# Patient Record
Sex: MALE
[De-identification: names, ages, dates, MRNs, and addresses within clinical notes are randomized per-mention and may not be internally consistent; named-entity substitution may affect disease eponyms.]

---

## 2020-04-01 ENCOUNTER — TRANSCRIPTION ENCOUNTER (OUTPATIENT)
Age: 65
End: 2020-04-01

## 2020-04-03 ENCOUNTER — TRANSCRIPTION ENCOUNTER (OUTPATIENT)
Age: 65
End: 2020-04-03

## 2020-06-19 ENCOUNTER — TRANSCRIPTION ENCOUNTER (OUTPATIENT)
Age: 65
End: 2020-06-19

## 2020-11-11 PROBLEM — Z00.00 ENCOUNTER FOR PREVENTIVE HEALTH EXAMINATION: Status: ACTIVE | Noted: 2020-11-11

## 2020-12-24 ENCOUNTER — INPATIENT (INPATIENT)
Facility: HOSPITAL | Age: 65
LOS: 0 days | Discharge: HOME | DRG: 65 | End: 2020-12-25
Attending: SPECIALIST | Admitting: SPECIALIST
Payer: COMMERCIAL

## 2020-12-24 VITALS
DIASTOLIC BLOOD PRESSURE: 110 MMHG | HEIGHT: 72 IN | WEIGHT: 220.02 LBS | RESPIRATION RATE: 18 BRPM | OXYGEN SATURATION: 96 % | TEMPERATURE: 98 F | SYSTOLIC BLOOD PRESSURE: 223 MMHG | HEART RATE: 49 BPM

## 2020-12-24 DIAGNOSIS — R20.2 PARESTHESIA OF SKIN: ICD-10-CM

## 2020-12-24 LAB
ALBUMIN SERPL ELPH-MCNC: 3.5 G/DL — SIGNIFICANT CHANGE UP (ref 3.3–5)
ALP SERPL-CCNC: 121 U/L — HIGH (ref 40–120)
ALT FLD-CCNC: 22 U/L — SIGNIFICANT CHANGE UP (ref 10–45)
ANION GAP SERPL CALC-SCNC: 13 MMOL/L — SIGNIFICANT CHANGE UP (ref 5–17)
APTT BLD: 34.5 SEC — SIGNIFICANT CHANGE UP (ref 27.5–35.5)
AST SERPL-CCNC: 16 U/L — SIGNIFICANT CHANGE UP (ref 10–40)
BASOPHILS # BLD AUTO: 0.05 K/UL — SIGNIFICANT CHANGE UP (ref 0–0.2)
BASOPHILS NFR BLD AUTO: 0.5 % — SIGNIFICANT CHANGE UP (ref 0–2)
BILIRUB SERPL-MCNC: 1 MG/DL — SIGNIFICANT CHANGE UP (ref 0.2–1.2)
BUN SERPL-MCNC: 23 MG/DL — SIGNIFICANT CHANGE UP (ref 7–23)
CALCIUM SERPL-MCNC: 8.8 MG/DL — SIGNIFICANT CHANGE UP (ref 8.4–10.5)
CHLORIDE SERPL-SCNC: 104 MMOL/L — SIGNIFICANT CHANGE UP (ref 96–108)
CK MB CFR SERPL CALC: 2.4 NG/ML — SIGNIFICANT CHANGE UP (ref 0–6.7)
CK SERPL-CCNC: 67 U/L — SIGNIFICANT CHANGE UP (ref 30–200)
CO2 SERPL-SCNC: 23 MMOL/L — SIGNIFICANT CHANGE UP (ref 22–31)
CREAT SERPL-MCNC: 0.94 MG/DL — SIGNIFICANT CHANGE UP (ref 0.5–1.3)
CRP SERPL-MCNC: 1.35 MG/DL — HIGH (ref 0–0.4)
EOSINOPHIL # BLD AUTO: 0.28 K/UL — SIGNIFICANT CHANGE UP (ref 0–0.5)
EOSINOPHIL NFR BLD AUTO: 3 % — SIGNIFICANT CHANGE UP (ref 0–6)
ERYTHROCYTE [SEDIMENTATION RATE] IN BLOOD: 46 MM/HR — HIGH (ref 0–20)
GLUCOSE SERPL-MCNC: 124 MG/DL — HIGH (ref 70–99)
HCT VFR BLD CALC: 40.1 % — SIGNIFICANT CHANGE UP (ref 39–50)
HGB BLD-MCNC: 13.6 G/DL — SIGNIFICANT CHANGE UP (ref 13–17)
IMM GRANULOCYTES NFR BLD AUTO: 0.5 % — SIGNIFICANT CHANGE UP (ref 0–1.5)
INR BLD: 1.1 RATIO — SIGNIFICANT CHANGE UP (ref 0.88–1.16)
LYMPHOCYTES # BLD AUTO: 1.69 K/UL — SIGNIFICANT CHANGE UP (ref 1–3.3)
LYMPHOCYTES # BLD AUTO: 18.2 % — SIGNIFICANT CHANGE UP (ref 13–44)
MCHC RBC-ENTMCNC: 27.9 PG — SIGNIFICANT CHANGE UP (ref 27–34)
MCHC RBC-ENTMCNC: 33.9 GM/DL — SIGNIFICANT CHANGE UP (ref 32–36)
MCV RBC AUTO: 82.2 FL — SIGNIFICANT CHANGE UP (ref 80–100)
MONOCYTES # BLD AUTO: 0.55 K/UL — SIGNIFICANT CHANGE UP (ref 0–0.9)
MONOCYTES NFR BLD AUTO: 5.9 % — SIGNIFICANT CHANGE UP (ref 2–14)
NEUTROPHILS # BLD AUTO: 6.65 K/UL — SIGNIFICANT CHANGE UP (ref 1.8–7.4)
NEUTROPHILS NFR BLD AUTO: 71.9 % — SIGNIFICANT CHANGE UP (ref 43–77)
NRBC # BLD: 0 /100 WBCS — SIGNIFICANT CHANGE UP (ref 0–0)
PLATELET # BLD AUTO: 303 K/UL — SIGNIFICANT CHANGE UP (ref 150–400)
PLATELET RESPONSE ASPIRIN RESULT: 410 ARU — SIGNIFICANT CHANGE UP (ref 350–700)
POTASSIUM SERPL-MCNC: 3.4 MMOL/L — LOW (ref 3.5–5.3)
POTASSIUM SERPL-SCNC: 3.4 MMOL/L — LOW (ref 3.5–5.3)
PROT SERPL-MCNC: 7.1 G/DL — SIGNIFICANT CHANGE UP (ref 6–8.3)
PROTHROM AB SERPL-ACNC: 13.1 SEC — SIGNIFICANT CHANGE UP (ref 10.6–13.6)
RBC # BLD: 4.88 M/UL — SIGNIFICANT CHANGE UP (ref 4.2–5.8)
RBC # FLD: 13.9 % — SIGNIFICANT CHANGE UP (ref 10.3–14.5)
SODIUM SERPL-SCNC: 140 MMOL/L — SIGNIFICANT CHANGE UP (ref 135–145)
TROPONIN T, HIGH SENSITIVITY RESULT: 14 NG/L — SIGNIFICANT CHANGE UP (ref 0–51)
WBC # BLD: 9.27 K/UL — SIGNIFICANT CHANGE UP (ref 3.8–10.5)
WBC # FLD AUTO: 9.27 K/UL — SIGNIFICANT CHANGE UP (ref 3.8–10.5)

## 2020-12-24 PROCEDURE — 70551 MRI BRAIN STEM W/O DYE: CPT | Mod: 26

## 2020-12-24 PROCEDURE — 71045 X-RAY EXAM CHEST 1 VIEW: CPT | Mod: 26

## 2020-12-24 PROCEDURE — 99285 EMERGENCY DEPT VISIT HI MDM: CPT

## 2020-12-24 PROCEDURE — 93306 TTE W/DOPPLER COMPLETE: CPT | Mod: 26

## 2020-12-24 PROCEDURE — 70496 CT ANGIOGRAPHY HEAD: CPT | Mod: 26

## 2020-12-24 PROCEDURE — 93010 ELECTROCARDIOGRAM REPORT: CPT

## 2020-12-24 PROCEDURE — 70498 CT ANGIOGRAPHY NECK: CPT | Mod: 26

## 2020-12-24 PROCEDURE — 99223 1ST HOSP IP/OBS HIGH 75: CPT

## 2020-12-24 RX ORDER — NICARDIPINE HYDROCHLORIDE 30 MG/1
5 CAPSULE, EXTENDED RELEASE ORAL
Qty: 40 | Refills: 0 | Status: DISCONTINUED | OUTPATIENT
Start: 2020-12-24 | End: 2020-12-24

## 2020-12-24 RX ORDER — ASPIRIN/CALCIUM CARB/MAGNESIUM 324 MG
81 TABLET ORAL DAILY
Refills: 0 | Status: DISCONTINUED | OUTPATIENT
Start: 2020-12-24 | End: 2020-12-24

## 2020-12-24 RX ORDER — ENOXAPARIN SODIUM 100 MG/ML
40 INJECTION SUBCUTANEOUS DAILY
Refills: 0 | Status: DISCONTINUED | OUTPATIENT
Start: 2020-12-24 | End: 2020-12-25

## 2020-12-24 RX ORDER — CLOPIDOGREL BISULFATE 75 MG/1
75 TABLET, FILM COATED ORAL DAILY
Refills: 0 | Status: DISCONTINUED | OUTPATIENT
Start: 2020-12-24 | End: 2020-12-25

## 2020-12-24 RX ORDER — LABETALOL HCL 100 MG
10 TABLET ORAL ONCE
Refills: 0 | Status: COMPLETED | OUTPATIENT
Start: 2020-12-24 | End: 2020-12-24

## 2020-12-24 RX ORDER — ZOLPIDEM TARTRATE 10 MG/1
5 TABLET ORAL AT BEDTIME
Refills: 0 | Status: DISCONTINUED | OUTPATIENT
Start: 2020-12-24 | End: 2020-12-25

## 2020-12-24 RX ORDER — PANTOPRAZOLE SODIUM 20 MG/1
40 TABLET, DELAYED RELEASE ORAL
Refills: 0 | Status: DISCONTINUED | OUTPATIENT
Start: 2020-12-24 | End: 2020-12-25

## 2020-12-24 RX ORDER — ATORVASTATIN CALCIUM 80 MG/1
80 TABLET, FILM COATED ORAL AT BEDTIME
Refills: 0 | Status: DISCONTINUED | OUTPATIENT
Start: 2020-12-24 | End: 2020-12-25

## 2020-12-24 RX ORDER — ASPIRIN/CALCIUM CARB/MAGNESIUM 324 MG
81 TABLET ORAL DAILY
Refills: 0 | Status: DISCONTINUED | OUTPATIENT
Start: 2020-12-25 | End: 2020-12-25

## 2020-12-24 RX ORDER — ALLOPURINOL 300 MG
100 TABLET ORAL DAILY
Refills: 0 | Status: DISCONTINUED | OUTPATIENT
Start: 2020-12-24 | End: 2020-12-25

## 2020-12-24 RX ORDER — ALPRAZOLAM 0.25 MG
0.75 TABLET ORAL DAILY
Refills: 0 | Status: DISCONTINUED | OUTPATIENT
Start: 2020-12-24 | End: 2020-12-25

## 2020-12-24 RX ORDER — POTASSIUM CHLORIDE 20 MEQ
40 PACKET (EA) ORAL ONCE
Refills: 0 | Status: COMPLETED | OUTPATIENT
Start: 2020-12-24 | End: 2020-12-24

## 2020-12-24 RX ADMIN — Medication 40 MILLIEQUIVALENT(S): at 14:45

## 2020-12-24 RX ADMIN — Medication 0.75 MILLIGRAM(S): at 23:02

## 2020-12-24 RX ADMIN — Medication 10 MILLIGRAM(S): at 12:30

## 2020-12-24 RX ADMIN — CLOPIDOGREL BISULFATE 75 MILLIGRAM(S): 75 TABLET, FILM COATED ORAL at 14:45

## 2020-12-24 RX ADMIN — ATORVASTATIN CALCIUM 80 MILLIGRAM(S): 80 TABLET, FILM COATED ORAL at 23:03

## 2020-12-24 RX ADMIN — ZOLPIDEM TARTRATE 5 MILLIGRAM(S): 10 TABLET ORAL at 23:01

## 2020-12-24 NOTE — OCCUPATIONAL THERAPY INITIAL EVALUATION ADULT - PERTINENT HX OF CURRENT PROBLEM, REHAB EVAL
64yo man with PMH of HTN, HLD, and ?DM presented with L-sided numbness that he noted after waking this morning around 0830h. Patient reports he had poor sleep the night prior but went to bed feeling well with LKN 0400h 12/24/20.

## 2020-12-24 NOTE — H&P ADULT - ATTENDING COMMENTS
VASCULAR NEUROLOGY ATTENDING  The patient is seen and examined the history and imaging are reviewed. I agree with the resident note unless otherwise noted. R thalamic infarct likely small vessel. Poorly controlled HTN. New JEFFRY. Appreciate cards recs. Aggressive vascular risk factor modification. Close outpatient follow-up.

## 2020-12-24 NOTE — ED ADULT NURSE NOTE - NSIMPLEMENTINTERV_GEN_ALL_ED
Implemented All Fall Risk Interventions:  [place] to call system. Call bell, personal items and telephone within reach. Instruct patient to call for assistance. Room bathroom lighting operational. Non-slip footwear when patient is off stretcher. Physically safe environment: no spills, clutter or unnecessary equipment. Stretcher in lowest position, wheels locked, appropriate side rails in place. Provide visual cue, wrist band, yellow gown, etc. Monitor gait and stability. Monitor for mental status changes and reorient to person, place, and time. Review medications for side effects contributing to fall risk. Reinforce activity limits and safety measures with patient and family.

## 2020-12-24 NOTE — CONSULT NOTE ADULT - ASSESSMENT
1. thalamic stroke small secondary to hypertension poorly controlled  2. hemodynmaically stable BP presently 170/80  3. no evidence of chf or ischemia    Recommend  2 D echo  asa plavix  lipitor  for BP discharge on   bystolic 20 AM 10 PM  lasix 40  restsart amlodipine increase to 5 BID if gingivitis an issue can switch to PO cardene  salt restriction diet and weigh loss  can consider adding spirinolacton3 25 as outpatient

## 2020-12-24 NOTE — CONSULT NOTE ADULT - SUBJECTIVE AND OBJECTIVE BOX
CHIEF COMPLAINT:  numbness hypertension out of control thalamic stroke  HISTORY OF PRESENT ILLNESS:  HPI:  Neurology  Consult Note  12-24-20    Name:  JAZMIN JONES; 65y (10-Benson-1955)    Reason for Admission: L-numbness    Chief Complaint:  HPI: 64yo man with PMH of HTN, HLD, and ?DM presented with L-sided numbness that he noted after waking this morning around 0830h. Patient reports he had poor sleep the night prior but went to bed feeling well with LKN 0400h 12/24/20. Patient states that when his wife awoke him he noted numbness along the L side of his mouth and his L fingers, which progressed to the rest of his L face/scalp and L arm and leg. Patient reports mild gait instability due to numbness but denies weakness, headache, changes in speech, dizziness, nausea, or vomiting. Patient reports inconsistent medication use but reports to be taking aspirin including this morning. In the ED code stroke was activated. NIHSS of 0. Pre-MRS of 0. CTH showed no acute pathology. CTA showed no LVO. Patient was not a candidate for tPA since he was out of window, and was not a candidate for thrombectomy due to no LVO.  admissopn /105 now improved  no cp or sob feels ok  has gained weight and is not careful with salt  has gingivitis on amlodipine  not always compliant with mes diet or salt  no cp or sob  Review of Systems:  As states in HPI.        PMHx:   HTN (hypertension) (24 Dec 2020 13:26)    PAST MEDICAL & SURGICAL HISTORY:  HTN (hypertension)            MEDICATIONS:  clopidogrel Tablet 75 milliGRAM(s) Oral daily  enoxaparin Injectable 40 milliGRAM(s) SubCutaneous daily  niCARdipine Infusion 5 mG/Hr IV Continuous <Continuous>        ALPRAZolam 0.75 milliGRAM(s) Oral daily  zolpidem 5 milliGRAM(s) Oral at bedtime PRN  zolpidem 5 milliGRAM(s) Oral at bedtime PRN    pantoprazole    Tablet 40 milliGRAM(s) Oral before breakfast    allopurinol 100 milliGRAM(s) Oral daily  atorvastatin 80 milliGRAM(s) Oral at bedtime        FAMILY HISTORY:      SOCIAL HISTORY:    [ ] Non-smoker  [ ] Smoker  [ ] Alcohol    Allergies    No Known Allergies    Intolerances      PHYSICAL EXAM:  T(C): 36.9 (12-24-20 @ 13:32), Max: 36.9 (12-24-20 @ 12:08)  HR: 54 (12-24-20 @ 16:00) (49 - 54)  BP: 197/92 (12-24-20 @ 16:00) (184/84 - 223/110)  RR: 14 (12-24-20 @ 16:00) (14 - 18)  SpO2: 96% (12-24-20 @ 16:00) (96% - 98%)  Wt(kg): --  I&O's Summary      Appearance: Normal	  HEENT:   Normal oral mucosa, PERRL, EOMI	  Cardiovascular: Normal S1 S2, No JVD, No murmurs  Respiratory: Lungs clear to auscultation	  Psychiatry: A & O x 3, Mood & affect appropriate  Gastrointestinal:  Soft, Non-tender, + BS	  Skin: No rashes, No ecchymoses, No cyanosis	  Neurologic: Non-focal  Extremities: No clubbing, cyanosis or edema  Vascular: Peripheral pulses palpable 2+ bilaterally    TELEMETRY: 	    ECG: NSR sinus bradycardia LVH 	  RADIOLOGY:  < from: MR Head No Cont (12.24.20 @ 13:06) >  NTERPRETATION:  CLINICAL INDICATION: Code stroke, left sensory paresthesias      Magnetic resonance imaging of the brain was carried out with transaxial FLAIR, , axial susceptibility weighted series, diffusion weighted series on a 1.5 Shreya magnet.    Comparison is made with the prior CT/CTA of .    The fourth, third and lateral ventricles are normal in size and position. There is no hemorrhage, mass or shift of the midline structures. A tiny focus of diffusion restriction is identified in the right thalamus. Scattered nonspecific white matter changes are also identified      IMPRESSION: Tiny acute infarct right thalamus.            	  	  LABS:	 	    CARDIAC MARKERS:                                  13.6   9.27  )-----------( 303      ( 24 Dec 2020 12:36 )             40.1     12-24    140  |  104  |  23  ----------------------------<  124<H>  3.4<L>   |  23  |  0.94    Ca    8.8      24 Dec 2020 12:36    TPro  7.1  /  Alb  3.5  /  TBili  1.0  /  DBili  x   /  AST  16  /  ALT  22  /  AlkPhos  121<H>  12-24    proBNP:   Lipid Profile:   HgA1c:   TSH:

## 2020-12-24 NOTE — ED PROVIDER NOTE - CARE PLAN
Principal Discharge DX:	Tingling of left upper extremity and left side of face  Secondary Diagnosis:	Hypertension, unspecified type   Principal Discharge DX:	Tingling of left upper extremity and left side of face  Secondary Diagnosis:	Hypertension, unspecified type  Secondary Diagnosis:	TIA (transient ischemic attack)

## 2020-12-24 NOTE — ED PROVIDER NOTE - PHYSICAL EXAMINATION
charo aaox3 nad ncat eprrl no gaze prwef sensation to face intaqct no drrop   s1s2 rrr ctabl abd soft nt - no rash  cn2-12intact no aphaseia - no drift strength 5/5 ue le bl - no dysmetria -

## 2020-12-24 NOTE — OCCUPATIONAL THERAPY INITIAL EVALUATION ADULT - LIVES WITH, PROFILE
Pt lives with wife in private house with 3 steps to enter and 18 steps inside. Pt at baseline is ind for ADLs and functional mobility. +works, +drives/spouse

## 2020-12-24 NOTE — H&P ADULT - NSHPLABSRESULTS_GEN_ALL_CORE
Labs:                        13.6   9.27  )-----------( 303      ( 24 Dec 2020 12:36 )             40.1     12-24    140  |  104  |  23  ----------------------------<  124<H>  3.4<L>   |  23  |  0.94    Ca    8.8      24 Dec 2020 12:36    TPro  7.1  /  Alb  3.5  /  TBili  1.0  /  DBili  x   /  AST  16  /  ALT  22  /  AlkPhos  121<H>  12-24    CAPILLARY BLOOD GLUCOSE      POCT Blood Glucose.: 123 mg/dL (24 Dec 2020 12:07)    LIVER FUNCTIONS - ( 24 Dec 2020 12:36 )  Alb: 3.5 g/dL / Pro: 7.1 g/dL / ALK PHOS: 121 U/L / ALT: 22 U/L / AST: 16 U/L / GGT: x             PT/INR - ( 24 Dec 2020 12:36 )   PT: 13.1 sec;   INR: 1.10 ratio    PTT - ( 24 Dec 2020 12:36 )  PTT:34.5 sec          Radiology:  MR Head No Cont:  (24 Dec 2020 13:06)  IMPRESSION: Tiny acute infarct right thalamus.      CT Head No Cont:  (24 Dec 2020 12:27)  IMPRESSION: Unremarkable noncontrast head CT. No hemorrhage. Normal CTA of the head and neck. 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

## 2020-12-24 NOTE — OCCUPATIONAL THERAPY INITIAL EVALUATION ADULT - ANTICIPATED DISCHARGE DISPOSITION, OT EVAL
Pt presents at baseline/WFL for ADLs and functional mobility. No Occupational Therapy needs at this time./no needs

## 2020-12-24 NOTE — H&P ADULT - NSHPPHYSICALEXAM_GEN_ALL_CORE
Vitals:  T(C): 36.9 (12-24-20 @ 12:08), Max: 36.9 (12-24-20 @ 12:08)  HR: 49 (12-24-20 @ 12:08) (49 - 49)  BP: 223/110 (12-24-20 @ 12:08) (223/110 - 223/110)  RR: 18 (12-24-20 @ 12:08) (18 - 18)  SpO2: 96% (12-24-20 @ 12:08) (96% - 96%)    Physical Examination:  Neurologic:  - Mental Status: Alert, awake, oriented to person, place, and time; Speech is fluent with intact naming, repetition, and comprehension, no dysarthria. Follows all commands.  - Cranial Nerves:  II: Visual fields are full to confrontation; Pupils are equal, round, and reactive to light  III, IV, VI: Extraocular movements are intact without nystagmus.  V: Facial sensation is grossly intact in the V1-V3 distribution bilaterally.  VII: Face is symmetric with normal eye closure and smile.  VIII: Hearing is intact to finger rub.  IX, X: Uvula is midline and soft palate rises symmetrically.  XI: Head turning and shoulder shrug are intact.  XII: Tongue protrudes in the midline.  - Motor: Strength is 5/5 throughout. There is no pronator drift.  - Reflexes: unable to elicit at the knees  - Sensory: Subjectively mildly diminished in the L extremities  - Coordination: Finer-nose-finger intact without dysmetria.  - Gait: Cautious but normal steps, base, arm swing, and turning.

## 2020-12-24 NOTE — H&P ADULT - HISTORY OF PRESENT ILLNESS
Neurology  Consult Note  12-24-20    Name:  JAZMIN JONES; 65y (10-Benson-1955)    Reason for Admission: L-numbness    Chief Complaint:  HPI: 64yo man with PMH of HTN, HLD, and ?DM presented with L-sided numbness that he noted after waking this morning around 0830h. Patient reports he had poor sleep the night prior but went to bed feeling well with LKN was 0400h 12/24/20. Patient states that when his wife awoke him he noted numbness along the L side of his mouth and his L fingers, which progressed to the rest of his L face/scalp and L arm and leg. Patient reports mild gait instability due to numbness but denies weakness, headache, changes in speech, dizziness, nausea, or vomiting. Patient reports inconsistent medication use but reports to be taking aspirin including this morning. In the ED code stroke was activated. NIHSS of 0. Pre-MRS of 0. CTH showed no acute pathology. CTA showed no LVO. Patient was not a candidate for tPA since he was out of window, and was not a candidate for thrombectomy due to no LVO.    Review of Systems:  As states in HPI.        PMHx:   HTN (hypertension) Neurology  Consult Note  12-24-20    Name:  JAZMIN JONES; 65y (10-Benson-1955)    Reason for Admission: L-numbness    Chief Complaint:  HPI: 66yo man with PMH of HTN, HLD, and ?DM presented with L-sided numbness that he noted after waking this morning around 0830h. Patient reports he had poor sleep the night prior but went to bed feeling well with LKN 0400h 12/24/20. Patient states that when his wife awoke him he noted numbness along the L side of his mouth and his L fingers, which progressed to the rest of his L face/scalp and L arm and leg. Patient reports mild gait instability due to numbness but denies weakness, headache, changes in speech, dizziness, nausea, or vomiting. Patient reports inconsistent medication use but reports to be taking aspirin including this morning. In the ED code stroke was activated. NIHSS of 0. Pre-MRS of 0. CTH showed no acute pathology. CTA showed no LVO. Patient was not a candidate for tPA since he was out of window, and was not a candidate for thrombectomy due to no LVO.    Review of Systems:  As states in HPI.        PMHx:   HTN (hypertension)

## 2020-12-24 NOTE — ED ADULT TRIAGE NOTE - CHIEF COMPLAINT QUOTE
left sided sensory deficits upon waking up this am at 0830am, last norm was last night prior to going to bed. neuro team with pt in triage left sided sensory deficits upon waking up this am at 0830am, last norm was last night prior to going to bed. neuro team with pt in triage  [facility] (+)

## 2020-12-24 NOTE — OCCUPATIONAL THERAPY INITIAL EVALUATION ADULT - PRECAUTIONS/LIMITATIONS, REHAB EVAL
CONTINUE: Patient states that when his wife awoke him he noted numbness along the L side of his mouth and his L fingers, which progressed to the rest of his L face/scalp and L arm and leg. Patient reports mild gait instability due to numbness but denies weakness, headache, changes in speech, dizziness, nausea, or vomiting. Patient reports inconsistent medication use but reports to be taking aspirin including this morning. In the ED code stroke was activated. NIHSS of 0. Pre-MRS of 0. CTH showed no acute pathology. CTA showed no LVO. Patient was not a candidate for tPA since he was out of window, and was not a candidate for thrombectomy due to no LVO. CONTINUE: Patient states that when his wife awoke him he noted numbness along the L side of his mouth and his L fingers, which progressed to the rest of his L face/scalp and L arm and leg. Patient reports mild gait instability due to numbness but denies weakness, headache, changes in speech, dizziness, nausea, or vomiting. Patient reports inconsistent medication use but reports to be taking aspirin including this morning. In the ED code stroke was activated. NIHSS of 0. Pre-MRS of 0. CTH showed no acute pathology. CTA showed no LVO. Patient was not a candidate for tPA since he was out of window, and was not a candidate for thrombectomy due to no LVO./fall precautions

## 2020-12-24 NOTE — OCCUPATIONAL THERAPY INITIAL EVALUATION ADULT - ADDITIONAL COMMENTS
MR Head (12/24): Tiny acute infarct right thalamus.  CT Angio (12/24): Unremarkable noncontrast head CT. No hemorrhage. Normal CTA of the head and neck. 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

## 2020-12-24 NOTE — ED ADULT NURSE NOTE - OBJECTIVE STATEMENT
65 yr old male from triage (accompanied by neuro attending dr jansen) with c/o L facial/L 4th&5th finger tingling at 830am today, which resolved and then returned shortly afterwards, last normal: last night, upon arrival to ED, neuro exam: unremarkable, +clear speech, maex4: strong, sensation intact, no facial asymmetry, follows all commands, +steady gait, *code stroke called, taken straight to CT, *labetolol 10 mg IVP given for hx hypertension/on asa, taken straight to MRI afterwards, neuro team present with pt --> neuro team to take pt up to [facility]/stroke unit afterwards, pt's belongings with pt, well appearing, no other c/o

## 2020-12-24 NOTE — ED ADULT NURSE NOTE - CHIEF COMPLAINT QUOTE
left sided sensory deficits upon waking up this am at 0830am, last norm was last night prior to going to bed. neuro team with pt in triage  [facility] (+)

## 2020-12-24 NOTE — H&P ADULT - ASSESSMENT
64yo man with PMH of HTN, HLD, and ?DM presented with L-sided numbness that he noted after waking this morning around 0830h. LKN was 0400h 12/24/20. In the ED code stroke was activated. Physical exam was remarkable for subjective L-sided numbness and elevated BP, but no other focal neurological deficit. Labs grossly WNL. NIHSS of 0. Pre-MRS of 0. CTH showed no acute pathology. CTA showed no LVO. Patient was not a candidate for tPA since he was out of window, and was not a candidate for thrombectomy due to no LVO.    Impression: L hemisensory loss due to acute R thalamic infarct likely due to small vessel disease, however further workup is warranted.    Recommendations:  [] permissive HTN for 24 hours up to 220/110, with Cardene IV drip for BP control for systolic no <180  [] gradual normotension after 24 hrs  [] telemetry monitoring  [] STAT repeat CTH if change in neuro status  [] Continue ASA 81 daily  [] Start Plavix 75mg PO daily for 3 weeks as per [facility] protocol  [] start atorvastatin 80 mg daily, titrate based on lipid profile  [] DVT ppx  [] TTE with bubble  [] CBC, BMP  [] Lipid panel  [] HbA1C  [] dysphagia screen      Case seen and discussed with stroke fellow Dr. Alonso and stroke attending Dr. Wong. 66yo man with PMH of HTN, HLD, and ?DM presented with L-sided numbness that he noted after waking this morning around 0830h. LKN was 0400h 12/24/20. In the ED code stroke was activated. Physical exam was remarkable for subjective L-sided numbness and elevated BP, but no other focal neurological deficit. Labs grossly WNL. NIHSS of 0. Pre-MRS of 0. CTH showed no acute pathology. CTA showed no LVO. Patient was not a candidate for tPA since he was out of window, and was not a candidate for thrombectomy due to no LVO.    Impression: L hemisensory loss due to acute R thalamic infarct likely due to small vessel disease, however further workup is warranted.    Recommendations:  [] permissive HTN for 24 hours up to 220/110, with Cardene IV drip for BP control for systolic no <180  [] gradual normotension after 24 hrs  [] telemetry monitoring  [] STAT repeat CTH if change in neuro status  [] Continue ASA 81 daily  [] Start Plavix 75mg PO daily for 3 weeks as per [facility] protocol  [] start atorvastatin 80 mg daily, titrate based on lipid profile  [] DVT ppx  [] PT/OT  [] TTE with bubble  [] CBC, BMP  [] Lipid panel  [] HbA1C  [] dysphagia screen      Case seen and discussed with stroke fellow Dr. Alonso and stroke attending Dr. Wong.

## 2020-12-24 NOTE — OCCUPATIONAL THERAPY INITIAL EVALUATION ADULT - LIGHT TOUCH SENSATION, LUE, REHAB EVAL
As per pt, pt reports diminished sensation on L UE/UE distal>proximal however still able to distinguish touch/deep pressure/within normal limits

## 2020-12-24 NOTE — ED PROVIDER NOTE - OBJECTIVE STATEMENT
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

## 2020-12-24 NOTE — ED PROVIDER NOTE - CLINICAL SUMMARY MEDICAL DECISION MAKING FREE TEXT BOX
hcaro - pt 66 yo poorly controlled htn - on asa woke this am at 830 with tingling of left face and tingling l fingers -- resolved after he ate and then started again -- no ha no n/v fs 190 at triage - pt neurologist called stroke code in triage - straight to ct -- no drift no aphasia no dropp no objective neuro deficits -- ct/cta prelim neg by dr woods - neuro rads pt bp 220/140 -- labetolol given -- pt to mri for eval

## 2020-12-25 ENCOUNTER — TRANSCRIPTION ENCOUNTER (OUTPATIENT)
Age: 65
End: 2020-12-25

## 2020-12-25 VITALS — HEART RATE: 54 BPM | RESPIRATION RATE: 15 BRPM | OXYGEN SATURATION: 99 %

## 2020-12-25 LAB
A1C WITH ESTIMATED AVERAGE GLUCOSE RESULT: 5.3 % — SIGNIFICANT CHANGE UP (ref 4–5.6)
ANION GAP SERPL CALC-SCNC: 10 MMOL/L — SIGNIFICANT CHANGE UP (ref 5–17)
ANION GAP SERPL CALC-SCNC: 13 MMOL/L — SIGNIFICANT CHANGE UP (ref 5–17)
BUN SERPL-MCNC: 12 MG/DL — SIGNIFICANT CHANGE UP (ref 7–23)
BUN SERPL-MCNC: 13 MG/DL — SIGNIFICANT CHANGE UP (ref 7–23)
CALCIUM SERPL-MCNC: 9 MG/DL — SIGNIFICANT CHANGE UP (ref 8.4–10.5)
CALCIUM SERPL-MCNC: 9.4 MG/DL — SIGNIFICANT CHANGE UP (ref 8.4–10.5)
CHLORIDE SERPL-SCNC: 101 MMOL/L — SIGNIFICANT CHANGE UP (ref 96–108)
CHLORIDE SERPL-SCNC: 105 MMOL/L — SIGNIFICANT CHANGE UP (ref 96–108)
CHOLEST SERPL-MCNC: 182 MG/DL — SIGNIFICANT CHANGE UP
CO2 SERPL-SCNC: 24 MMOL/L — SIGNIFICANT CHANGE UP (ref 22–31)
CO2 SERPL-SCNC: 28 MMOL/L — SIGNIFICANT CHANGE UP (ref 22–31)
CREAT SERPL-MCNC: 0.86 MG/DL — SIGNIFICANT CHANGE UP (ref 0.5–1.3)
CREAT SERPL-MCNC: 0.96 MG/DL — SIGNIFICANT CHANGE UP (ref 0.5–1.3)
ESTIMATED AVERAGE GLUCOSE: 105 MG/DL — SIGNIFICANT CHANGE UP (ref 68–114)
GLUCOSE SERPL-MCNC: 160 MG/DL — HIGH (ref 70–99)
GLUCOSE SERPL-MCNC: 89 MG/DL — SIGNIFICANT CHANGE UP (ref 70–99)
HCT VFR BLD CALC: 38.3 % — LOW (ref 39–50)
HCV AB S/CO SERPL IA: 0.12 S/CO — SIGNIFICANT CHANGE UP (ref 0–0.99)
HCV AB SERPL-IMP: SIGNIFICANT CHANGE UP
HDLC SERPL-MCNC: 25 MG/DL — LOW
HGB BLD-MCNC: 13.1 G/DL — SIGNIFICANT CHANGE UP (ref 13–17)
LIPID PNL WITH DIRECT LDL SERPL: 125 MG/DL — HIGH
MCHC RBC-ENTMCNC: 28.1 PG — SIGNIFICANT CHANGE UP (ref 27–34)
MCHC RBC-ENTMCNC: 34.2 GM/DL — SIGNIFICANT CHANGE UP (ref 32–36)
MCV RBC AUTO: 82 FL — SIGNIFICANT CHANGE UP (ref 80–100)
NON HDL CHOLESTEROL: 157 MG/DL — HIGH
NRBC # BLD: 0 /100 WBCS — SIGNIFICANT CHANGE UP (ref 0–0)
PLATELET # BLD AUTO: 323 K/UL — SIGNIFICANT CHANGE UP (ref 150–400)
POTASSIUM SERPL-MCNC: 3 MMOL/L — LOW (ref 3.5–5.3)
POTASSIUM SERPL-MCNC: 3.8 MMOL/L — SIGNIFICANT CHANGE UP (ref 3.5–5.3)
POTASSIUM SERPL-SCNC: 3 MMOL/L — LOW (ref 3.5–5.3)
POTASSIUM SERPL-SCNC: 3.8 MMOL/L — SIGNIFICANT CHANGE UP (ref 3.5–5.3)
RBC # BLD: 4.67 M/UL — SIGNIFICANT CHANGE UP (ref 4.2–5.8)
RBC # FLD: 14.1 % — SIGNIFICANT CHANGE UP (ref 10.3–14.5)
SARS-COV-2 RNA SPEC QL NAA+PROBE: SIGNIFICANT CHANGE UP
SODIUM SERPL-SCNC: 139 MMOL/L — SIGNIFICANT CHANGE UP (ref 135–145)
SODIUM SERPL-SCNC: 142 MMOL/L — SIGNIFICANT CHANGE UP (ref 135–145)
TRIGL SERPL-MCNC: 160 MG/DL — HIGH
WBC # BLD: 8.81 K/UL — SIGNIFICANT CHANGE UP (ref 3.8–10.5)
WBC # FLD AUTO: 8.81 K/UL — SIGNIFICANT CHANGE UP (ref 3.8–10.5)

## 2020-12-25 PROCEDURE — 87040 BLOOD CULTURE FOR BACTERIA: CPT

## 2020-12-25 PROCEDURE — U0003: CPT

## 2020-12-25 PROCEDURE — 85025 COMPLETE CBC W/AUTO DIFF WBC: CPT

## 2020-12-25 PROCEDURE — 85610 PROTHROMBIN TIME: CPT

## 2020-12-25 PROCEDURE — 86140 C-REACTIVE PROTEIN: CPT

## 2020-12-25 PROCEDURE — 70496 CT ANGIOGRAPHY HEAD: CPT

## 2020-12-25 PROCEDURE — 85576 BLOOD PLATELET AGGREGATION: CPT

## 2020-12-25 PROCEDURE — 99285 EMERGENCY DEPT VISIT HI MDM: CPT | Mod: 25

## 2020-12-25 PROCEDURE — 83036 HEMOGLOBIN GLYCOSYLATED A1C: CPT

## 2020-12-25 PROCEDURE — 97161 PT EVAL LOW COMPLEX 20 MIN: CPT

## 2020-12-25 PROCEDURE — 85027 COMPLETE CBC AUTOMATED: CPT

## 2020-12-25 PROCEDURE — 86769 SARS-COV-2 COVID-19 ANTIBODY: CPT

## 2020-12-25 PROCEDURE — 86803 HEPATITIS C AB TEST: CPT

## 2020-12-25 PROCEDURE — 80048 BASIC METABOLIC PNL TOTAL CA: CPT

## 2020-12-25 PROCEDURE — 80053 COMPREHEN METABOLIC PANEL: CPT

## 2020-12-25 PROCEDURE — 96374 THER/PROPH/DIAG INJ IV PUSH: CPT

## 2020-12-25 PROCEDURE — 82962 GLUCOSE BLOOD TEST: CPT

## 2020-12-25 PROCEDURE — 80061 LIPID PANEL: CPT

## 2020-12-25 PROCEDURE — 93005 ELECTROCARDIOGRAM TRACING: CPT

## 2020-12-25 PROCEDURE — 70450 CT HEAD/BRAIN W/O DYE: CPT

## 2020-12-25 PROCEDURE — 70551 MRI BRAIN STEM W/O DYE: CPT

## 2020-12-25 PROCEDURE — 70498 CT ANGIOGRAPHY NECK: CPT

## 2020-12-25 PROCEDURE — 84484 ASSAY OF TROPONIN QUANT: CPT

## 2020-12-25 PROCEDURE — 82553 CREATINE MB FRACTION: CPT

## 2020-12-25 PROCEDURE — 71045 X-RAY EXAM CHEST 1 VIEW: CPT

## 2020-12-25 PROCEDURE — 93306 TTE W/DOPPLER COMPLETE: CPT

## 2020-12-25 PROCEDURE — 82550 ASSAY OF CK (CPK): CPT

## 2020-12-25 PROCEDURE — 85652 RBC SED RATE AUTOMATED: CPT

## 2020-12-25 PROCEDURE — 85730 THROMBOPLASTIN TIME PARTIAL: CPT

## 2020-12-25 PROCEDURE — 97165 OT EVAL LOW COMPLEX 30 MIN: CPT

## 2020-12-25 RX ORDER — POTASSIUM CHLORIDE 20 MEQ
40 PACKET (EA) ORAL EVERY 4 HOURS
Refills: 0 | Status: COMPLETED | OUTPATIENT
Start: 2020-12-25 | End: 2020-12-25

## 2020-12-25 RX ORDER — ATORVASTATIN CALCIUM 80 MG/1
40 TABLET, FILM COATED ORAL AT BEDTIME
Refills: 0 | Status: DISCONTINUED | OUTPATIENT
Start: 2020-12-25 | End: 2020-12-25

## 2020-12-25 RX ORDER — CLOPIDOGREL BISULFATE 75 MG/1
1 TABLET, FILM COATED ORAL
Qty: 21 | Refills: 0
Start: 2020-12-25 | End: 2021-01-14

## 2020-12-25 RX ORDER — ATORVASTATIN CALCIUM 80 MG/1
1 TABLET, FILM COATED ORAL
Qty: 90 | Refills: 0
Start: 2020-12-25 | End: 2021-03-24

## 2020-12-25 RX ORDER — AMLODIPINE BESYLATE 2.5 MG/1
1 TABLET ORAL
Qty: 120 | Refills: 0
Start: 2020-12-25 | End: 2021-02-22

## 2020-12-25 RX ADMIN — Medication 81 MILLIGRAM(S): at 11:19

## 2020-12-25 RX ADMIN — Medication 40 MILLIEQUIVALENT(S): at 08:24

## 2020-12-25 RX ADMIN — PANTOPRAZOLE SODIUM 40 MILLIGRAM(S): 20 TABLET, DELAYED RELEASE ORAL at 07:28

## 2020-12-25 RX ADMIN — Medication 100 MILLIGRAM(S): at 11:19

## 2020-12-25 RX ADMIN — Medication 40 MILLIEQUIVALENT(S): at 07:24

## 2020-12-25 RX ADMIN — CLOPIDOGREL BISULFATE 75 MILLIGRAM(S): 75 TABLET, FILM COATED ORAL at 11:19

## 2020-12-25 NOTE — DISCHARGE NOTE PROVIDER - CARE PROVIDERS DIRECT ADDRESSES
,DirectAddress_Unknown,DirectAddress_Unknown,[email or web address]

## 2020-12-25 NOTE — DISCHARGE NOTE PROVIDER - CARE PROVIDER_API CALL
Kade Wong  NEUROLOGY  [address], [address]  [place] [address]  Phone: [phone]  Fax: [phone]  Follow Up Time: 1 week    Erik Clark  CARDIOVASCULAR DISEASE  [address], [address] [place] [address]  Phone: [phone]  Fax: [phone]  Follow Up Time: 1 week    Brian Foster  CRITICAL CARE MEDICINE  [address], [address]  [place] [address]  Phone: [phone]  Fax: [phone]  Follow Up Time: 1 week

## 2020-12-25 NOTE — PHYSICAL THERAPY INITIAL EVALUATION ADULT - PRECAUTIONS/LIMITATIONS, REHAB EVAL
CONTINUED: Patient states that when his wife awoke him he noted numbness along the L side of his mouth and his L fingers, which progressed to the rest of his L face/scalp and L arm and leg. Patient reports mild gait instability due to numbness but denies weakness, headache, changes in speech, dizziness, nausea, or vomiting. Patient reports inconsistent medication use but reports to be taking aspirin including this morning. In the ED code stroke was activated. NIHSS of 0. Pre-MRS of 0. CTH showed no acute pathology. CTA showed no LVO. Patient was not a candidate for tPA since he was out of window, and was not a candidate for thrombectomy due to no LVO./fall precautions

## 2020-12-25 NOTE — PHYSICAL THERAPY INITIAL EVALUATION ADULT - PERTINENT HX OF CURRENT PROBLEM, REHAB EVAL
64 yo man with PMHx of HTN, HLD, and ?DM presented with L-sided numbness that he noted after waking. Patient reports he had poor sleep the night prior but went to bed feeling well with LKN 0400h 12/24/20.

## 2020-12-25 NOTE — PHYSICAL THERAPY INITIAL EVALUATION ADULT - LIVES WITH, PROFILE
Pt lives with wife in private house with 3 steps to enter and 18 steps inside +unilateral handrail to assist. Pt at baseline is ind for ADLs and functional mobility. +works, +drives/spouse

## 2020-12-25 NOTE — PHYSICAL THERAPY INITIAL EVALUATION ADULT - GAIT TRAINING, PT EVAL
GOAL: Pt will ambulate at least 300' independently with good balance and appropriate pacing within 3-4 wks.

## 2020-12-25 NOTE — PHYSICAL THERAPY INITIAL EVALUATION ADULT - PLANNED THERAPY INTERVENTIONS, PT EVAL
STAIR GOAL: Pt will negotiate at least 3 FOS independently with handrail assist within 3-4 wks to prepare for return to work./balance training/gait training

## 2020-12-25 NOTE — DISCHARGE NOTE PROVIDER - HOSPITAL COURSE
64yo man with PMH of HTN, HLD, and ?DM presented with L-sided numbness that he noted after waking this morning around 0830h. Patient reports he had poor sleep the night prior but went to bed feeling well with LKN 0400h 12/24/20. Patient states that when his wife awoke him he noted numbness along the L side of his mouth and his L fingers, which progressed to the rest of his L face/scalp and L arm and leg. Patient reports mild gait instability due to numbness but denies weakness, headache, changes in speech, dizziness, nausea, or vomiting. Patient reports inconsistent medication use but reports to be taking aspirin including this morning. In the ED code stroke was activated. NIHSS of 0. Pre-MRS of 0. CTH showed no acute pathology. CTA showed no LVO. Patient was not a candidate for tPA since he was out of window, and was not a candidate for thrombectomy due to no LVO.    MRI HEAD: Tiny acute infarct right thalamus.  CT HEAD, CTA H/N:  Unremarkable noncontrast head CT. No hemorrhage. Normal CTA of the head and neck. 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    Impression: right thalamic infarct due small vessel disease    TTE unremarkable, evaluated by cardiology with BP medication modifications.  Will need a sleep apnea study as outpatient.  Needs aggressive vascular risk factor modification.    Discharged on ASA and Plavix for 3 weeks and lipitor for stroke prevention.     Evaluated by PT/OT and was recommended home with outpatient. Patient stable for discharge.

## 2020-12-25 NOTE — DISCHARGE NOTE PROVIDER - NSDCMRMEDTOKEN_GEN_ALL_CORE_FT
allopurinol 100 mg oral tablet: 1  orally once a day  amLODIPine 5 mg oral tablet: 1 tab(s) orally 2 times a day   aspirin 81 mg oral tablet, chewable: 1 tab(s) orally once a day  atorvastatin 40 mg oral tablet: 1 tab(s) orally once a day (at bedtime)  Bystolic 20 mg oral tablet: 1 tab(s) orally once a day  clopidogrel 75 mg oral tablet: 1 tab(s) orally once a day  irbesartan 300 mg oral tablet: 1 tab(s) orally once a day  Lasix 40 mg oral tablet: 1 tab(s) orally once a day  metFORMIN 500 mg oral tablet, extended release: 1 tab(s) orally once a day at night  Outpatient Physical Therapy: Dx: Stroke  potassium chloride 20 mEq oral tablet, extended release: 2 cap(s) orally once a day  PriLOSEC OTC 20 mg oral delayed release tablet: 1 tab(s) orally once a day  Propecia 1 mg oral tablet: 1 tab(s) orally once a day  Xanax: 0.75 cap(s) orally once a day at night  zolpidem 10 mg oral tablet: 1 tab(s) orally once a day (at bedtime) at night

## 2020-12-25 NOTE — DISCHARGE NOTE PROVIDER - NSDCCPCAREPLAN_GEN_ALL_CORE_FT
PRINCIPAL DISCHARGE DIAGNOSIS  Diagnosis: Stroke  Assessment and Plan of Treatment: Please follow up with neurologist in 1 week. Continue taking medications as prescribed. Monitor your blood pressure. Reduce fat, cholesterol and salt in your diet. Increase intake of fruits and vegetables. Limit alcohol to minimum and do not smoke. You may be at risk for falling, make changes to your home to help you walk easier. Keep up to date on vaccinations.  If you experience any symptoms of facial drooping, slurred speech, arm or leg weakness, severe headache, vision changes or any worsening symptoms, notify provider immediatley and return to ER.        SECONDARY DISCHARGE DIAGNOSES  Diagnosis: Hypertension, unspecified type  Assessment and Plan of Treatment: will need close outpatient cardiology follow up.

## 2020-12-25 NOTE — PHYSICAL THERAPY INITIAL EVALUATION ADULT - DIAGNOSIS, PT EVAL
Pt presents with mild sensation (L UE/LE) deficits, balance and endurance deficits not impacting ability to perform ADLs or functional mobility

## 2020-12-25 NOTE — DISCHARGE NOTE PROVIDER - PROVIDER TOKENS
PROVIDER:[TOKEN:[[id number]],FOLLOWUP:[1 week]],PROVIDER:[TOKEN:[[id number]],FOLLOWUP:[1 week]],PROVIDER:[TOKEN:[[id number]],FOLLOWUP:[1 week]]

## 2020-12-25 NOTE — DISCHARGE NOTE NURSING/CASE MANAGEMENT/SOCIAL WORK - PATIENT PORTAL LINK FT
You can access the FollowMyHealth Patient Portal offered by [facility] by registering at the following website: [email or web address]. By joining [facility]’s FollowMyHealth portal, you will also be able to view your health information using other applications (apps) compatible with our system.

## 2020-12-26 LAB
SARS-COV-2 IGG SERPL QL IA: NEGATIVE — SIGNIFICANT CHANGE UP
SARS-COV-2 IGM SERPL IA-ACNC: 0.86 INDEX — SIGNIFICANT CHANGE UP

## 2020-12-29 LAB
CULTURE RESULTS: SIGNIFICANT CHANGE UP
CULTURE RESULTS: SIGNIFICANT CHANGE UP
SPECIMEN SOURCE: SIGNIFICANT CHANGE UP
SPECIMEN SOURCE: SIGNIFICANT CHANGE UP

## 2021-03-19 ENCOUNTER — RX RENEWAL (OUTPATIENT)
Age: 66
End: 2021-03-19

## 2021-06-16 ENCOUNTER — RX RENEWAL (OUTPATIENT)
Age: 66
End: 2021-06-16

## 2021-06-16 RX ORDER — ATORVASTATIN CALCIUM 40 MG/1
40 TABLET, FILM COATED ORAL
Qty: 90 | Refills: 0 | Status: ACTIVE | COMMUNITY
Start: 2021-03-19 | End: 1900-01-01

## 2021-09-07 NOTE — PHYSICAL THERAPY INITIAL EVALUATION ADULT - LEVEL OF INDEPENDENCE: STAND/SIT, REHAB EVAL
Follow up with your primary care physician within 2-3 days.     Rest, Ice 15 min on/ 15 min off, Elevate and keep compression of affected area. Take Ibuprofen 600mg Orally every 6 hours as needed for pain.     Stay hydrated    Return to the ER if your symptoms worsen or for any other medical emergencies  ***************** independent

## 2021-11-04 NOTE — PATIENT PROFILE ADULT - NSPROGENBLOODRESTRICT_GEN_A_NUR
Colin Mcpherson was returning phone call to [facility] in regards to an order that was sent.  Please advise none

## 2022-04-30 RX ORDER — ZOLPIDEM TARTRATE 10 MG/1
1 TABLET ORAL
Qty: 0 | Refills: 0 | DISCHARGE

## 2022-04-30 RX ORDER — FUROSEMIDE 40 MG
1 TABLET ORAL
Qty: 0 | Refills: 0 | DISCHARGE

## 2022-04-30 RX ORDER — ATENOLOL 25 MG/1
1 TABLET ORAL
Qty: 0 | Refills: 0 | DISCHARGE

## 2022-04-30 RX ORDER — METFORMIN HYDROCHLORIDE 850 MG/1
1 TABLET ORAL
Qty: 0 | Refills: 0 | DISCHARGE

## 2022-04-30 RX ORDER — ALLOPURINOL 300 MG
1 TABLET ORAL
Qty: 0 | Refills: 0 | DISCHARGE

## 2022-04-30 RX ORDER — IRBESARTAN 75 MG/1
1 TABLET ORAL
Qty: 0 | Refills: 0 | DISCHARGE

## 2022-04-30 RX ORDER — NEBIVOLOL HYDROCHLORIDE 5 MG/1
1 TABLET ORAL
Qty: 0 | Refills: 0 | DISCHARGE

## 2022-04-30 RX ORDER — POTASSIUM CHLORIDE 20 MEQ
2 PACKET (EA) ORAL
Qty: 0 | Refills: 0 | DISCHARGE

## 2022-04-30 RX ORDER — ASPIRIN/CALCIUM CARB/MAGNESIUM 324 MG
1 TABLET ORAL
Qty: 0 | Refills: 0 | DISCHARGE

## 2022-04-30 RX ORDER — FINASTERIDE 5 MG/1
1 TABLET, FILM COATED ORAL
Qty: 0 | Refills: 0 | DISCHARGE

## 2022-04-30 RX ORDER — OMEPRAZOLE 10 MG/1
1 CAPSULE, DELAYED RELEASE ORAL
Qty: 0 | Refills: 0 | DISCHARGE

## 2022-04-30 RX ORDER — ALPRAZOLAM 0.25 MG
0.75 TABLET ORAL
Qty: 0 | Refills: 0 | DISCHARGE

## 2022-07-14 ENCOUNTER — NON-APPOINTMENT (OUTPATIENT)
Age: 67
End: 2022-07-14

## 2023-07-30 NOTE — PHYSICAL THERAPY INITIAL EVALUATION ADULT - BALANCE TRAINING, PT EVAL
GOAL: Pt will improve static/dynamic standing balance by at least 1/2 grade to decrease fall risk during functional mobility within 3-4 wks.

## 2024-08-22 ENCOUNTER — TRANSCRIPTION ENCOUNTER (OUTPATIENT)
Age: 69
End: 2024-08-22

## 2024-08-22 PROBLEM — I10 ESSENTIAL (PRIMARY) HYPERTENSION: Chronic | Status: ACTIVE | Noted: 2020-12-24

## 2024-08-26 ENCOUNTER — APPOINTMENT (OUTPATIENT)
Dept: PULMONOLOGY | Facility: CLINIC | Age: 69
End: 2024-08-26
Payer: COMMERCIAL

## 2024-08-26 VITALS
WEIGHT: 240 LBS | HEIGHT: 72 IN | HEART RATE: 81 BPM | OXYGEN SATURATION: 95 % | BODY MASS INDEX: 32.51 KG/M2 | SYSTOLIC BLOOD PRESSURE: 120 MMHG | DIASTOLIC BLOOD PRESSURE: 82 MMHG

## 2024-08-26 DIAGNOSIS — I48.91 UNSPECIFIED ATRIAL FIBRILLATION: ICD-10-CM

## 2024-08-26 DIAGNOSIS — I21.9 ACUTE MYOCARDIAL INFARCTION, UNSPECIFIED: ICD-10-CM

## 2024-08-26 PROCEDURE — 99205 OFFICE O/P NEW HI 60 MIN: CPT | Mod: 25

## 2024-08-26 PROCEDURE — 71046 X-RAY EXAM CHEST 2 VIEWS: CPT

## 2024-08-26 RX ORDER — ASPIRIN 81 MG
81 TABLET, DELAYED RELEASE (ENTERIC COATED) ORAL
Refills: 0 | Status: ACTIVE | COMMUNITY

## 2024-08-26 RX ORDER — CLOPIDOGREL 75 MG/1
75 TABLET, FILM COATED ORAL
Refills: 0 | Status: ACTIVE | COMMUNITY

## 2024-08-26 RX ORDER — LOSARTAN POTASSIUM AND HYDROCHLOROTHIAZIDE 12.5; 1 MG/1; MG/1
TABLET ORAL
Refills: 0 | Status: ACTIVE | COMMUNITY

## 2024-08-26 RX ORDER — AMLODIPINE BESYLATE 5 MG/1
5 TABLET ORAL
Refills: 0 | Status: ACTIVE | COMMUNITY

## 2024-08-26 RX ORDER — APIXABAN 5 MG/1
5 TABLET, FILM COATED ORAL
Refills: 0 | Status: ACTIVE | COMMUNITY

## 2024-08-26 RX ORDER — EPLERENONE 50 MG/1
50 TABLET, COATED ORAL
Refills: 0 | Status: ACTIVE | COMMUNITY

## 2024-08-26 RX ORDER — AMOXICILLIN AND CLAVULANATE POTASSIUM 875; 125 MG/1; MG/1
875-125 TABLET, COATED ORAL
Qty: 20 | Refills: 0 | Status: ACTIVE | COMMUNITY
Start: 2024-08-26 | End: 1900-01-01

## 2024-08-26 RX ORDER — ATENOLOL 50 MG/1
50 TABLET ORAL
Refills: 0 | Status: ACTIVE | COMMUNITY

## 2024-08-27 PROBLEM — I48.91 ATRIAL FIBRILLATION, UNSPECIFIED TYPE: Status: ACTIVE | Noted: 2024-08-27

## 2024-08-27 PROBLEM — I21.9 MYOCARDIAL INFARCTION, UNSPECIFIED MI TYPE, UNSPECIFIED ARTERY: Status: ACTIVE | Noted: 2024-08-27

## 2024-08-27 NOTE — CONSULT LETTER
[Dear  ___] : Dear ~MAURIZIO, [Consult Letter:] : I had the pleasure of evaluating your patient, [unfilled]. [Consult Closing:] : Thank you very much for allowing me to participate in the care of this patient.  If you have any questions, please do not hesitate to contact me. [Sincerely,] : Sincerely, [FreeTextEntry2] : Erik Clark MD [FreeTextEntry3] : [facility] Washington [facility]

## 2024-08-27 NOTE — PHYSICAL EXAM
[No Acute Distress] : no acute distress [Normal Oropharynx] : normal oropharynx [Normal Appearance] : normal appearance [No Neck Mass] : no neck mass [Normal Rate/Rhythm] : normal rate/rhythm [Normal S1, S2] : normal s1, s2 [No Murmurs] : no murmurs [No Resp Distress] : no resp distress [Clear to Auscultation Bilaterally] : clear to auscultation bilaterally [No Abnormalities] : no abnormalities [Benign] : benign [Normal Gait] : normal gait [No Clubbing] : no clubbing [No Cyanosis] : no cyanosis [No Edema] : no edema [FROM] : FROM [Normal Color/ Pigmentation] : normal color/ pigmentation [No Focal Deficits] : no focal deficits [Oriented x3] : oriented x3 [Normal Affect] : normal affect [Normal to Percussion] : normal to percussion [TextBox_68] : Few crackles left base.  No wheezing.

## 2024-08-27 NOTE — CONSULT LETTER
[Dear  ___] : Dear ~MAURIZIO, [Consult Letter:] : I had the pleasure of evaluating your patient, [unfilled]. [Consult Closing:] : Thank you very much for allowing me to participate in the care of this patient.  If you have any questions, please do not hesitate to contact me. [Sincerely,] : Sincerely, [FreeTextEntry2] : Erik Clark MD [FreeTextEntry3] : [facility]

## 2024-08-27 NOTE — ASSESSMENT
[FreeTextEntry1] : Change to Zithromax and Augmentin. Labs pending. Likely will benefit from sleep study in the future. Seeing cardiology. Continue famotidine. Follow-up in few days time or sooner on appearing basis. Will benefit from follow-up CT in the future.

## 2024-08-27 NOTE — HISTORY OF PRESENT ILLNESS
[Never] : never [TextBox_4] : JAZMIN JONES is a 69 year old  M referred for pulmonary evaluation for  Issues with significant reflux after very spicy food.  Few days persisted went to cardiology. Saw GI and treated GERD. Sent off Troponin minimally elevated. Had cath and had stents placed in Cx. Sent home. Next day developed increase in CP felt poorly. Went to ER.  Found sats 90-91. Had CTA. No fever.  Positive cough past few days. Occ mucous does not see. Some chronic sinus congestion.  ? SOB. Does not feel right. Pain in left shoulder area cannot tell if muscular or pleuritic.  Sent of cephalosporin and zithro.   Yesterday very tired. Went back to Dr. Clark today in atrial fib.   Past pulmonary history. Some wheezing few months ago. Seasonal allergies.  Occupational Exposure. N Family history of pulmonary disease. Mother asthma. Daughter mild asthma Recent travel  N Pets Dog not allergic.

## 2024-08-27 NOTE — CONSULT LETTER
[Dear  ___] : Dear ~MAURIZIO, [Consult Letter:] : I had the pleasure of evaluating your patient, [unfilled]. [Consult Closing:] : Thank you very much for allowing me to participate in the care of this patient.  If you have any questions, please do not hesitate to contact me. [Sincerely,] : Sincerely, [FreeTextEntry2] : Erik Clark MD [FreeTextEntry3] : Erik Washington [facility]

## 2024-08-27 NOTE — DISCUSSION/SUMMARY
[FreeTextEntry1] : Community-acquired pneumonia must consider aspiration as possible etiology. Significant reflux consideration for the etiology above as well as symptom complex at onset of symptoms. Status post probable myocardial infarction. New onset atrial fibrillation. Likely will need to rule out obstructive sleep apnea syndrome in this patient. Fatigue and lethargy most likely multifactorial related to the decrease in blood pressure multiple medications as well as recent events.  Likely not hypercarbic. Status post stent placement. No clinical or radiographic evidence of congestive heart failure. Probable diastolic dysfunction.

## 2024-08-27 NOTE — HISTORY OF PRESENT ILLNESS
[Never] : never [TextBox_4] : JAMZIN JONES is a 69 year old  M referred for pulmonary evaluation for  Issues with significant reflux after very spicy food.  Few days persisted went to cardiology. Saw GI and treated GERD. Sent off Troponin minimally elevated. Had cath and had stents placed in Cx. Sent home. Next day developed increase in CP felt poorly. Went to ER.  Found sats 90-91. Had CTA. No fever.  Positive cough past few days. Occ mucous does not see. Some chronic sinus congestion.  ? SOB. Does not feel right. Pain in left shoulder area cannot tell if muscular or pleuritic.  Sent of cephalosporin and zithro.   Yesterday very tired. Went back to Dr. Clark today in atrial fib.   Past pulmonary history. Some wheezing few months ago. Seasonal allergies.  Occupational Exposure. N Family history of pulmonary disease. Mother asthma. Daughter mild asthma Recent travel  N Pets Dog not allergic.

## 2024-08-30 ENCOUNTER — APPOINTMENT (OUTPATIENT)
Dept: PULMONOLOGY | Facility: CLINIC | Age: 69
End: 2024-08-30
Payer: COMMERCIAL

## 2024-08-30 VITALS — DIASTOLIC BLOOD PRESSURE: 89 MMHG | OXYGEN SATURATION: 98 % | HEART RATE: 78 BPM | SYSTOLIC BLOOD PRESSURE: 138 MMHG

## 2024-08-30 DIAGNOSIS — J18.9 PNEUMONIA, UNSPECIFIED ORGANISM: ICD-10-CM

## 2024-08-30 DIAGNOSIS — Z76.89 PERSONS ENCOUNTERING HEALTH SERVICES IN OTHER SPECIFIED CIRCUMSTANCES: ICD-10-CM

## 2024-08-30 PROBLEM — E11.9 TYPE 2 DIABETES MELLITUS WITHOUT COMPLICATIONS: Chronic | Status: ACTIVE | Noted: 2024-08-22

## 2024-08-30 PROBLEM — I63.9 CEREBRAL INFARCTION, UNSPECIFIED: Chronic | Status: ACTIVE | Noted: 2024-08-22

## 2024-08-30 PROCEDURE — 99213 OFFICE O/P EST LOW 20 MIN: CPT

## 2024-08-30 PROCEDURE — 71046 X-RAY EXAM CHEST 2 VIEWS: CPT

## 2024-08-30 NOTE — PHYSICAL EXAM
[No Acute Distress] : no acute distress [Normal Oropharynx] : normal oropharynx [Normal Appearance] : normal appearance [No Neck Mass] : no neck mass [Normal Rate/Rhythm] : normal rate/rhythm [Normal S1, S2] : normal s1, s2 [No Murmurs] : no murmurs [No Resp Distress] : no resp distress [Normal to Percussion] : normal to percussion [No Abnormalities] : no abnormalities [Benign] : benign [Normal Gait] : normal gait [No Clubbing] : no clubbing [No Cyanosis] : no cyanosis [No Edema] : no edema [Normal Color/ Pigmentation] : normal color/ pigmentation [No Focal Deficits] : no focal deficits [Oriented x3] : oriented x3 [Normal Affect] : normal affect [Clear to Auscultation Bilaterally] : clear to auscultation bilaterally

## 2024-08-31 PROBLEM — Z76.89 SLEEP CONCERN: Status: ACTIVE | Noted: 2024-08-27

## 2024-08-31 NOTE — HISTORY OF PRESENT ILLNESS
[Never] : never [TextBox_4] : Patient feeling significantly improved. Now in normal sinus rhythm. Some diarrhea from Augmentin. Did develop cough which is improved over the past 1 to 2 days. Denies shortness of breath.  Some upper airway congestion. Chest discomfort resolved.   J

## 2024-08-31 NOTE — DISCUSSION/SUMMARY
[FreeTextEntry1] : Community-acquired pneumonia must consider aspiration as possible etiology.  Clinically and radiographically improved. Significant reflux consideration for the etiology above as well as symptom complex at onset of symptoms. Status post probable myocardial infarction. New onset atrial fibrillation. Likely will need to rule out obstructive sleep apnea syndrome in this patient. Fatigue and lethargy most likely multifactorial related to the decrease in blood pressure multiple medications as well as recent events.  Likely not hypercarbic. Status post stent placement. No clinical or radiographic evidence of congestive heart failure. Probable diastolic dysfunction.

## 2024-08-31 NOTE — ASSESSMENT
[FreeTextEntry1] : Complete course of Zithromax and Augmentin. Likely will benefit from sleep study in the future. Seeing cardiology. Continue famotidine. Arrange follow-up CT in approximately 1 month. Follow-up post CT or sooner as needed basis.

## 2024-08-31 NOTE — CONSULT LETTER
[Dear  ___] : Dear ~MAURIZIO, [Consult Letter:] : I had the pleasure of evaluating your patient, [unfilled]. [Consult Closing:] : Thank you very much for allowing me to participate in the care of this patient.  If you have any questions, please do not hesitate to contact me. [Sincerely,] : Sincerely, [FreeTextEntry2] : Erik Clark MD [FreeTextEntry3] : [facility] Washington [facility]

## 2024-08-31 NOTE — CONSULT LETTER
[Dear  ___] : Dear ~MAURIZIO, [Consult Letter:] : I had the pleasure of evaluating your patient, [unfilled]. [Consult Closing:] : Thank you very much for allowing me to participate in the care of this patient.  If you have any questions, please do not hesitate to contact me. [Sincerely,] : Sincerely, [FreeTextEntry2] : Erik Clark MD [FreeTextEntry3] : Erik Washington [facility]

## 2024-09-03 RX ORDER — HYDROCODONE BITARTRATE AND HOMATROPINE METHYLBROMIDE 1.5; 5 MG/5ML; MG/5ML
5-1.5 SOLUTION ORAL
Qty: 150 | Refills: 0 | Status: ACTIVE | COMMUNITY
Start: 2024-09-03 | End: 1900-01-01

## 2025-06-18 NOTE — ED ADULT TRIAGE NOTE - WEIGHT IN KG
99.8 Care was discussed and reviewed in the interdisciplinary treatment team.  I, Liza Cope MD, have reviewed and verified the documentation.

## 2025-09-02 ENCOUNTER — APPOINTMENT (OUTPATIENT)
Dept: PULMONOLOGY | Facility: CLINIC | Age: 70
End: 2025-09-02
Payer: COMMERCIAL

## 2025-09-02 VITALS
HEART RATE: 86 BPM | SYSTOLIC BLOOD PRESSURE: 150 MMHG | WEIGHT: 242 LBS | HEIGHT: 72 IN | RESPIRATION RATE: 16 BRPM | BODY MASS INDEX: 32.78 KG/M2 | OXYGEN SATURATION: 95 % | DIASTOLIC BLOOD PRESSURE: 95 MMHG

## 2025-09-02 DIAGNOSIS — R05.1 ACUTE COUGH: ICD-10-CM

## 2025-09-02 DIAGNOSIS — Z76.89 PERSONS ENCOUNTERING HEALTH SERVICES IN OTHER SPECIFIED CIRCUMSTANCES: ICD-10-CM

## 2025-09-02 DIAGNOSIS — I10 ESSENTIAL (PRIMARY) HYPERTENSION: ICD-10-CM

## 2025-09-02 PROCEDURE — 71046 X-RAY EXAM CHEST 2 VIEWS: CPT

## 2025-09-02 PROCEDURE — 99215 OFFICE O/P EST HI 40 MIN: CPT | Mod: 25

## 2025-09-02 PROCEDURE — 94010 BREATHING CAPACITY TEST: CPT

## 2025-09-02 RX ORDER — FLUTICASONE PROPIONATE 50 UG/1
50 SPRAY NASAL
Qty: 1 | Refills: 4 | Status: ACTIVE | COMMUNITY
Start: 2025-09-02 | End: 1900-01-01

## 2025-09-02 RX ORDER — LEVALBUTEROL TARTRATE 45 UG/1
45 AEROSOL, METERED ORAL
Qty: 1 | Refills: 5 | Status: ACTIVE | COMMUNITY
Start: 2025-09-02 | End: 1900-01-01